# Patient Record
Sex: FEMALE | Race: WHITE | NOT HISPANIC OR LATINO | Employment: OTHER | URBAN - METROPOLITAN AREA
[De-identification: names, ages, dates, MRNs, and addresses within clinical notes are randomized per-mention and may not be internally consistent; named-entity substitution may affect disease eponyms.]

---

## 2022-06-08 ENCOUNTER — OFFICE VISIT (OUTPATIENT)
Dept: FAMILY MEDICINE | Facility: CLINIC | Age: 67
End: 2022-06-08
Payer: MEDICARE

## 2022-06-08 VITALS
RESPIRATION RATE: 18 BRPM | DIASTOLIC BLOOD PRESSURE: 68 MMHG | TEMPERATURE: 98 F | SYSTOLIC BLOOD PRESSURE: 118 MMHG | HEIGHT: 60 IN | OXYGEN SATURATION: 97 % | WEIGHT: 106 LBS | BODY MASS INDEX: 20.81 KG/M2 | HEART RATE: 81 BPM

## 2022-06-08 DIAGNOSIS — Z13.29 SCREENING FOR THYROID DISORDER: ICD-10-CM

## 2022-06-08 DIAGNOSIS — F43.22 ADJUSTMENT DISORDER WITH ANXIOUS MOOD: ICD-10-CM

## 2022-06-08 DIAGNOSIS — C91.10 CLL (CHRONIC LYMPHOCYTIC LEUKEMIA): ICD-10-CM

## 2022-06-08 DIAGNOSIS — F41.9 ANXIETY AND DEPRESSION: ICD-10-CM

## 2022-06-08 DIAGNOSIS — E78.5 HYPERLIPIDEMIA, UNSPECIFIED HYPERLIPIDEMIA TYPE: ICD-10-CM

## 2022-06-08 DIAGNOSIS — F17.200 SMOKER: Primary | ICD-10-CM

## 2022-06-08 DIAGNOSIS — I47.10 SVT (SUPRAVENTRICULAR TACHYCARDIA): ICD-10-CM

## 2022-06-08 DIAGNOSIS — M19.90 ARTHRITIS: ICD-10-CM

## 2022-06-08 DIAGNOSIS — J44.9 CHRONIC OBSTRUCTIVE PULMONARY DISEASE, UNSPECIFIED COPD TYPE: ICD-10-CM

## 2022-06-08 DIAGNOSIS — F32.A ANXIETY AND DEPRESSION: ICD-10-CM

## 2022-06-08 DIAGNOSIS — A60.00 GENITAL HERPES SIMPLEX, UNSPECIFIED SITE: ICD-10-CM

## 2022-06-08 DIAGNOSIS — M81.0 OSTEOPOROSIS, UNSPECIFIED OSTEOPOROSIS TYPE, UNSPECIFIED PATHOLOGICAL FRACTURE PRESENCE: ICD-10-CM

## 2022-06-08 PROBLEM — Z98.890 S/P BILATERAL BREAST REDUCTION: Status: ACTIVE | Noted: 2022-06-08

## 2022-06-08 PROBLEM — C80.1 CANCER: Status: ACTIVE | Noted: 2022-06-08

## 2022-06-08 PROCEDURE — 99205 OFFICE O/P NEW HI 60 MIN: CPT | Mod: S$GLB,,, | Performed by: FAMILY MEDICINE

## 2022-06-08 PROCEDURE — 99205 PR OFFICE/OUTPT VISIT, NEW, LEVL V, 60-74 MIN: ICD-10-PCS | Mod: S$GLB,,, | Performed by: FAMILY MEDICINE

## 2022-06-08 RX ORDER — BUPROPION HYDROCHLORIDE 150 MG/1
150 TABLET ORAL DAILY
COMMUNITY
Start: 2022-04-14

## 2022-06-08 RX ORDER — BENZONATATE 200 MG/1
CAPSULE ORAL
COMMUNITY
End: 2022-06-08

## 2022-06-08 RX ORDER — FENTANYL 50 UG/1
PATCH TRANSDERMAL
COMMUNITY
Start: 2022-06-02

## 2022-06-08 RX ORDER — IPRATROPIUM BROMIDE AND ALBUTEROL SULFATE 2.5; .5 MG/3ML; MG/3ML
3 SOLUTION RESPIRATORY (INHALATION)
COMMUNITY

## 2022-06-08 RX ORDER — DIPHENHYDRAMINE HCL 50 MG
50 CAPSULE ORAL
COMMUNITY

## 2022-06-08 RX ORDER — UMECLIDINIUM BROMIDE AND VILANTEROL TRIFENATATE 62.5; 25 UG/1; UG/1
1 POWDER RESPIRATORY (INHALATION) 2 TIMES DAILY PRN
COMMUNITY
Start: 2022-05-13

## 2022-06-08 RX ORDER — MINERAL OIL
180 ENEMA (ML) RECTAL
COMMUNITY

## 2022-06-08 RX ORDER — ATENOLOL 25 MG/1
25 TABLET ORAL DAILY
COMMUNITY
Start: 2022-04-14

## 2022-06-08 RX ORDER — PROMETHAZINE HYDROCHLORIDE 25 MG/1
25 TABLET ORAL EVERY 6 HOURS PRN
COMMUNITY
Start: 2022-03-08

## 2022-06-08 RX ORDER — VALACYCLOVIR HYDROCHLORIDE 1 G/1
1000 TABLET, FILM COATED ORAL DAILY
COMMUNITY
Start: 2022-01-21

## 2022-06-08 RX ORDER — OXYCODONE AND ACETAMINOPHEN 5; 325 MG/1; MG/1
1 TABLET ORAL
COMMUNITY
End: 2022-06-08

## 2022-06-08 RX ORDER — SIMVASTATIN 20 MG/1
20 TABLET, FILM COATED ORAL
COMMUNITY
End: 2022-06-08

## 2022-06-08 RX ORDER — CLONAZEPAM 2 MG/1
2 TABLET ORAL 3 TIMES DAILY PRN
COMMUNITY
Start: 2022-06-02

## 2022-06-08 RX ORDER — ALBUTEROL SULFATE 90 UG/1
AEROSOL, METERED RESPIRATORY (INHALATION)
COMMUNITY
Start: 2022-05-13

## 2022-06-08 RX ORDER — HYDROMORPHONE HYDROCHLORIDE 8 MG/1
8 TABLET ORAL 3 TIMES DAILY PRN
COMMUNITY
Start: 2022-06-02

## 2022-06-08 RX ORDER — METOPROLOL TARTRATE 50 MG/1
50 TABLET ORAL
COMMUNITY
End: 2022-06-08

## 2022-06-08 RX ORDER — DEXAMETHASONE SODIUM PHOSPHATE 100 MG/10ML
10 INJECTION INTRAMUSCULAR; INTRAVENOUS
COMMUNITY
End: 2022-06-08

## 2022-06-08 RX ORDER — IBUPROFEN 800 MG/1
800 TABLET ORAL
COMMUNITY

## 2022-06-08 RX ORDER — DULOXETIN HYDROCHLORIDE 60 MG/1
60 CAPSULE, DELAYED RELEASE ORAL DAILY
COMMUNITY
Start: 2022-04-04

## 2022-06-08 RX ORDER — DESVENLAFAXINE SUCCINATE 50 MG/1
TABLET, EXTENDED RELEASE ORAL
COMMUNITY
End: 2022-06-08

## 2022-06-08 NOTE — PROGRESS NOTES
Subjective:       Patient ID: Donna Castellano is a 66 y.o. female.    Chief Complaint: Establish Care      Review of patient's allergies indicates:  No Known Allergies   Here to get established Has been seeing MD in [place] for chronic pain.  Says she Suffers with chronic back pain. She has CLL and would like to   See Oncology    Review of Systems   Constitutional: Negative for chills, fatigue, fever and unexpected weight change.   HENT: Negative for ear pain, rhinorrhea, sinus pressure/congestion, sneezing and sore throat.    Eyes: Negative for photophobia and pain.   Respiratory: Negative for chest tightness, shortness of breath and wheezing.    Cardiovascular: Negative for chest pain.   Gastrointestinal: Negative for abdominal distention, abdominal pain, constipation, diarrhea and nausea.   Endocrine: Negative for polydipsia, polyphagia and polyuria.   Genitourinary: Negative for dysuria, frequency, menstrual problem, pelvic pain and urgency.   Musculoskeletal: Positive for arthralgias and back pain. Negative for joint swelling.   Integumentary:  Negative for rash, wound, breast mass and breast discharge.   Allergic/Immunologic: Negative for immunocompromised state.   Neurological: Negative for dizziness, vertigo, weakness and headaches.   Psychiatric/Behavioral: Negative for agitation, dysphoric mood and sleep disturbance.   All other systems reviewed and are negative.  Breast: Negative for mass        Objective:      Vitals:    06/08/22 1619   BP: 118/68   Pulse: 81   Resp: 18   Temp: 98 °F (36.7 °C)     Physical Exam  Vitals and nursing note reviewed.   Constitutional:       Appearance: Normal appearance.   HENT:      Head: Normocephalic.      Right Ear: Tympanic membrane normal.      Left Ear: Tympanic membrane normal.      Nose: Nose normal.      Mouth/Throat:      Mouth: Mucous membranes are moist.   Eyes:      Pupils: Pupils are equal, round, and reactive to light.   Cardiovascular:      Rate and Rhythm:  Normal rate and regular rhythm.   Pulmonary:      Effort: Pulmonary effort is normal.   Abdominal:      General: Abdomen is flat. Bowel sounds are normal.      Palpations: Abdomen is soft.   Musculoskeletal:         General: Normal range of motion.      Comments: Multiple scars up and down lumbar spine  A few scars over pelvis   moves all extremities   Skin:     General: Skin is warm and dry.   Neurological:      General: No focal deficit present.      Mental Status: She is alert.   Psychiatric:         Mood and Affect: Mood normal.         Behavior: Behavior normal.         Assessment:       1. Smoker    2. Adjustment disorder with anxious mood    3. Anxiety and depression    4. Chronic obstructive pulmonary disease, unspecified COPD type    5. Arthritis    6. SVT (supraventricular tachycardia)    7. Osteoporosis, unspecified osteoporosis type, unspecified pathological fracture presence    8. CLL (chronic lymphocytic leukemia)    9. Genital herpes simplex, unspecified site    10. Hyperlipidemia, unspecified hyperlipidemia type    11. Screening for thyroid disorder        Plan:       Smoker    Adjustment disorder with anxious mood    Anxiety and depression    Chronic obstructive pulmonary disease, unspecified COPD type    Arthritis    SVT (supraventricular tachycardia)    Osteoporosis, unspecified osteoporosis type, unspecified pathological fracture presence    CLL (chronic lymphocytic leukemia)  -     Ambulatory referral/consult to Hematology / Oncology; Future; Expected date: 06/22/2022  -     Ambulatory referral/consult to Pain Clinic; Future; Expected date: 06/22/2022    Genital herpes simplex, unspecified site    Hyperlipidemia, unspecified hyperlipidemia type  -     CBC Auto Differential; Future; Expected date: 06/15/2022  -     Lipid Panel; Future; Expected date: 06/15/2022  -     Comprehensive Metabolic Panel; Future; Expected date: 06/15/2022    Screening for thyroid disorder  -     TSH; Future; Expected  date: 06/15/2022  -     T4, free; Future; Expected date: 06/15/2022         I have kindly explained to her that I can not give her dilaudid, fentanyl and klonipin. I recommend she see pain management   refer to Oncolology for CLL  Follow up in about 4 weeks (around 7/6/2022).

## 2022-06-09 ENCOUNTER — PATIENT MESSAGE (OUTPATIENT)
Dept: ADMINISTRATIVE | Facility: HOSPITAL | Age: 67
End: 2022-06-09
Payer: MEDICARE

## 2022-07-26 ENCOUNTER — TELEPHONE (OUTPATIENT)
Dept: PRIMARY CARE CLINIC | Facility: CLINIC | Age: 67
End: 2022-07-26
Payer: MEDICARE

## 2022-07-26 NOTE — TELEPHONE ENCOUNTER
----- Message from Tsering Rehman sent at 7/26/2022 12:13 PM CDT -----  Contact: pt  Pt is requesting orders to be put in and sent to the following    Orders Requested: labs that was to be done last month-- don't know the orders  But has cancel-CLL  Reason for the orders: cancer   Additional Information: orders for 6/10 she had.     Please call pt to inform them the orders have been entered and sent so that they are able to call and schedule.     Call Back #: [phone]   Fax #:    Name of Company being Faxed: [facility] on [address]    Call her if you need to be sure what she needs.   Thanks

## 2022-07-26 NOTE — TELEPHONE ENCOUNTER
----- Message from Tsering Rehman sent at 7/26/2022 11:54 AM CDT -----  Contact: PT  Type:  Patient Requesting Referral    Who Called:  pt  Does the patient already have the specialty appointment scheduled?:  none  If yes, what is the date of that appointment?:  none  Referral to What Specialty:  ONCOLOGY  Reason for Referral:  see chart  Does the patient want the referral with a specific physician?:  [facility]    Is the specialist an [facility]?:  NO  Patient Requesting a Call Back?:  PLEASE CALL SO SHE KNOWS WHICH ONE YOU ARE USING PLEASE AND WANTS TO BE SURE       Best Call Back Number:  [phone]  Additional Information:

## 2022-07-27 ENCOUNTER — TELEPHONE (OUTPATIENT)
Dept: PRIMARY CARE CLINIC | Facility: CLINIC | Age: 67
End: 2022-07-27
Payer: MEDICARE

## 2022-07-27 DIAGNOSIS — C80.1 CANCER: Primary | ICD-10-CM

## 2022-07-27 DIAGNOSIS — Z12.11 COLON CANCER SCREENING: ICD-10-CM

## 2022-07-27 DIAGNOSIS — C91.10 CLL (CHRONIC LYMPHOCYTIC LEUKEMIA): ICD-10-CM

## 2022-07-27 NOTE — TELEPHONE ENCOUNTER
Request to see [facility] On[facility] and would like a referral. Also wants her lab orders to be sent to [facility].

## 2022-07-27 NOTE — TELEPHONE ENCOUNTER
----- Message from Faye Nettles sent at 7/27/2022  1:38 PM CDT -----  Type: Needs Medical Advice         Who Called: pt  Best Call Back Number:[phone]  Additional Information: Requesting a call back regarding  pt returned call and wouldlike a call back please.   Please Advise- Thank you

## 2022-08-01 ENCOUNTER — PATIENT MESSAGE (OUTPATIENT)
Dept: ADMINISTRATIVE | Facility: HOSPITAL | Age: 67
End: 2022-08-01
Payer: MEDICARE

## 2022-08-02 ENCOUNTER — PATIENT MESSAGE (OUTPATIENT)
Dept: ADMINISTRATIVE | Facility: HOSPITAL | Age: 67
End: 2022-08-02
Payer: MEDICARE

## 2022-08-02 DIAGNOSIS — Z12.11 SCREENING FOR COLON CANCER: ICD-10-CM

## 2022-08-10 ENCOUNTER — TELEPHONE (OUTPATIENT)
Dept: FAMILY MEDICINE | Facility: CLINIC | Age: 67
End: 2022-08-10
Payer: MEDICARE

## 2022-08-10 NOTE — TELEPHONE ENCOUNTER
----- Message from Mariajose Kenney sent at 8/10/2022  3:06 PM CDT -----  Contact: Mayda Domínguez  Type: Needs Medical Advice    Who Called: Mayda/ Surv[facility]  Best Call Back Number: [phone]  Additional  Information: Need pt lab orders faxed to [phone], pt going to have them drawn in the morning  Please Advise- Thank you

## 2022-08-12 ENCOUNTER — TELEPHONE (OUTPATIENT)
Dept: FAMILY MEDICINE | Facility: CLINIC | Age: 67
End: 2022-08-12
Payer: MEDICARE

## 2022-08-12 DIAGNOSIS — Z13.29 SCREENING FOR THYROID DISORDER: Primary | ICD-10-CM

## 2022-08-12 DIAGNOSIS — R53.83 FATIGUE, UNSPECIFIED TYPE: ICD-10-CM

## 2022-08-12 DIAGNOSIS — E78.5 HYPERLIPIDEMIA, UNSPECIFIED HYPERLIPIDEMIA TYPE: ICD-10-CM

## 2022-08-12 NOTE — TELEPHONE ENCOUNTER
----- Message from Sarah Hamilton sent at 8/12/2022  4:30 PM CDT -----  Contact: pt  Type: Return Call    Who Called: pt  Who Left Message for Pt: pt unsure   Does the patient know what this is regarding: yes  Best Call Back Number: [phone]  Thank you~

## 2022-08-19 ENCOUNTER — TELEPHONE (OUTPATIENT)
Dept: FAMILY MEDICINE | Facility: CLINIC | Age: 67
End: 2022-08-19
Payer: MEDICARE

## 2022-08-19 NOTE — TELEPHONE ENCOUNTER
----- Message from Alvaro Escalera sent at 8/19/2022  3:57 PM CDT -----  Type:  Patient Returning Call    Who Called:  patient  Who Left Message for Patient:    Does the patient know what this is regarding?:    Best Call Back Number:  [phone]   Additional Information:

## 2022-08-31 DIAGNOSIS — Z78.0 MENOPAUSE: ICD-10-CM

## 2022-09-01 LAB — HEMOCCULT STL QL IA: NEGATIVE

## 2023-08-01 ENCOUNTER — PATIENT MESSAGE (OUTPATIENT)
Dept: ADMINISTRATIVE | Facility: HOSPITAL | Age: 68
End: 2023-08-01
Payer: MEDICARE

## 2023-09-21 DIAGNOSIS — Z78.0 MENOPAUSE: ICD-10-CM
